# Patient Record
Sex: FEMALE | Race: WHITE | NOT HISPANIC OR LATINO | Employment: OTHER | URBAN - METROPOLITAN AREA
[De-identification: names, ages, dates, MRNs, and addresses within clinical notes are randomized per-mention and may not be internally consistent; named-entity substitution may affect disease eponyms.]

---

## 2017-01-16 LAB — COLOGUARD-ABSTRACT: NEGATIVE

## 2018-04-17 ENCOUNTER — TRANSFERRED RECORDS (OUTPATIENT)
Dept: HEALTH INFORMATION MANAGEMENT | Facility: CLINIC | Age: 73
End: 2018-04-17

## 2018-04-23 ENCOUNTER — TRANSFERRED RECORDS (OUTPATIENT)
Dept: HEALTH INFORMATION MANAGEMENT | Facility: CLINIC | Age: 73
End: 2018-04-23

## 2018-06-21 DIAGNOSIS — D33.3 ACOUSTIC NEUROMA (H): Primary | ICD-10-CM

## 2018-06-23 NOTE — TELEPHONE ENCOUNTER
FUTURE VISIT INFORMATION      FUTURE VISIT INFORMATION:    Date: 6-26-18    Time:     Location:   REFERRAL INFORMATION:    Referring provider:  MOSHE BRITT     Referring providers clinic:  [facility]    Reason for visit/diagnosis  acoustic schwannoma    RECORDS REQUESTED FROM:       Clinic name Comments Records Status Imaging Status   [facility] Office notes  Audio: 4-17-18, 8-12-10, 4-25-18,11-21-17 Received     Suburb imaging MRI: 4-23-18 received Received                              RECORDS STATUS

## 2018-06-26 ENCOUNTER — PRE VISIT (OUTPATIENT)
Dept: OTOLARYNGOLOGY | Facility: CLINIC | Age: 73
End: 2018-06-26

## 2018-06-26 ENCOUNTER — OFFICE VISIT (OUTPATIENT)
Dept: OTOLARYNGOLOGY | Facility: CLINIC | Age: 73
End: 2018-06-26
Payer: MEDICARE

## 2018-06-26 ENCOUNTER — OFFICE VISIT (OUTPATIENT)
Dept: AUDIOLOGY | Facility: CLINIC | Age: 73
End: 2018-06-26
Payer: MEDICARE

## 2018-06-26 DIAGNOSIS — H90.3 SENSORY HEARING LOSS, BILATERAL: Primary | ICD-10-CM

## 2018-06-26 DIAGNOSIS — D33.3 VESTIBULAR SCHWANNOMA (H): Primary | ICD-10-CM

## 2018-06-26 RX ORDER — MAGNESIUM HYDROXIDE/ALUMINUM HYDROXICE/SIMETHICONE 120; 1200; 1200 MG/30ML; MG/30ML; MG/30ML
125 SUSPENSION ORAL PRN
COMMUNITY

## 2018-06-26 ASSESSMENT — PAIN SCALES - GENERAL: PAINLEVEL: NO PAIN (0)

## 2018-06-26 NOTE — MR AVS SNAPSHOT
After Visit Summary   2018    Umm Hallman    MRN: [id number]           Patient Information     Date Of Birth          1945        Visit Information        Provider Department      2018 12:30 PM Alondra Little AuD [facility] Audiology        Today's Diagnoses     Sensory hearing loss, bilateral    -  1      Care Instructions    .          Follow-ups after your visit        Who to contact     Please call your clinic at [phone] to:    Ask questions about your health    Make or cancel appointments    Discuss your medicines    Learn about your test results    Speak to your doctor            Additional Information About Your Visit        MyChart Information     [employer] is an electronic gateway that provides easy, online access to your medical records. With [employer], you can request a clinic appointment, read your test results, renew a prescription or communicate with your care team.     To sign up for [employer] visit the website at [email or web address]   You will be asked to enter the access code listed below, as well as some personal information. Please follow the directions to create your username and password.     Your access code is: [email or web address]  Expires: 2018  1:34 PM     Your access code will  in 90 days. If you need help or a new code, please contact your [facility] or call [phone] for assistance.        Care EveryWhere ID     This is your Care EveryWhere ID. This could be used by other organizations to access your [place] medical records  [id number]         Blood Pressure from Last 3 Encounters:   03/07/10 148/82    Weight from Last 3 Encounters:   03/07/10 66.7 kg (147 lb)              We Performed the Following     AUDIOGRAM/TYMPANOGRAM - INTERFACE     [facility] Audiometry Thrld Eval & Speech Recog (71340)     Tymps / Reflex   ([id number])        Primary Care Provider Office Phone # Fax #    Alberto Garza,  [phone]       [facility] [address]  [facility] [address]        Equal Access to Services     RUBIO GIBBONS : Dima lyssa lopez jenny Menchaca, porsha meredithivania, jennifer santizo, shantal dorain hayaan jassmagy kay laotfevelyn nova. So [facility] [phone].    ATENCIÓN: Si habla español, tiene a pedraza disposición servicios gratuitos de asistencia lingüística. Dano al [phone].    We comply with applicable federal civil rights laws and [place] laws. We do not discriminate on the basis of race, color, national origin, age, disability, sex, sexual orientation, or gender identity.            Thank you!     Thank you for choosing [facility] AUDIOLOGY  for your care. Our goal is always to provide you with excellent care. Hearing back from our patients is one way we can continue to improve our services. Please take a few minutes to complete the written survey that you may receive in the mail after your visit with us. Thank you!             Your Updated Medication List - Protect others around you: Learn how to safely use, store and throw away your medicines at www.disposemymeds.org.          This list is accurate as of 6/26/18  1:34 PM.  Always use your most recent med list.                   Brand Name Dispense Instructions for use Diagnosis    alum & mag hydroxide-simethicone 200-200-20 MG/5ML Susp suspension    MYLANTA/MAALOX     Take 125 mLs by mouth as needed for indigestion        ASPIRIN PO      Take 81 mg by mouth daily        SIMVASTATIN PO      Take 5 mg by mouth daily        * SUDAFED 12 HOUR PO      None Entered        * pseudoePHEDrine 30 MG/5ML liquid    SUDAFED     Take 30 mg by mouth daily        ZOLPIDEM TARTRATE PO      Take 2 mg by mouth nightly as needed for sleep        * Notice:  This list has 2 medication(s) that are the same as other medications prescribed for you. Read the directions carefully, and ask your doctor or other care provider to review them with you.

## 2018-06-26 NOTE — PROGRESS NOTES
AUDIOLOGY REPORT    SUMMARY: Audiology visit completed. See audiogram for results.      RECOMMENDATIONS: Follow-up with ENT.      Gail Pimentel, CCC-A  Licensed Audiologist  [place] #[id number]

## 2018-06-26 NOTE — MR AVS SNAPSHOT
After Visit Summary   2018    Umm Hallman    MRN: [id number]           Patient Information     Date Of Birth          1945        Visit Information        Provider Department      2018 1:30 PM Viji Burkett MD [facility] Ear Nose and Throat        Care Instructions    1. Please follow-up in clinic in 1 year with audiogram and MRI scan.   2. Please call the ENT clinic with any questions,concerns, new or worsening symptoms.    -Clinic number is [phone]   - [facility] direct line (Dr. Burkett and Dr. Knight' nurse) [phone]              Follow-ups after your visit        Who to contact     Please call your clinic at [phone] to:    Ask questions about your health    Make or cancel appointments    Discuss your medicines    Learn about your test results    Speak to your doctor            Additional Information About Your Visit        MyChart Information     [employer] is an electronic gateway that provides easy, online access to your medical records. With [employer], you can request a clinic appointment, read your test results, renew a prescription or communicate with your care team.     To sign up for [employer] visit the website at [email or web address]   You will be asked to enter the access code listed below, as well as some personal information. Please follow the directions to create your username and password.     Your access code is: [email or web address]  Expires: 2018  1:34 PM     Your access code will  in 90 days. If you need help or a new code, please contact your [facility] or call [phone] for assistance.        Care EveryWhere ID     This is your Care EveryWhere ID. This could be used by other organizations to access your [place] medical records  [id number]         Blood Pressure from Last 3 Encounters:   03/07/10 148/82    Weight from Last 3 Encounters:   03/07/10 66.7 kg (147 lb)              Today, you had the  following     No orders found for display       Primary Care Provider Office Phone # Fax #    Alberto Garza [phone]       [facility] [address]  [facility] [address]        Equal Access to Services     RUBIO GIBBONS : Hadsuhail lyssa ku hadtitao Sopatiali, waaxda luqadaha, qaybta kaalmada adeegyada, shantal kay laTroyjuan bhatt. So [facility] [phone].    ATENCIÓN: Si habla español, tiene a pedraza disposición servicios gratuitos de asistencia lingüística. Ll[facility] [phone].    We comply with applicable federal civil rights laws and [place] laws. We do not discriminate on the basis of race, color, national origin, age, disability, sex, sexual orientation, or gender identity.            Thank you!     Thank you for choosing [facility] EAR NOSE AND THROAT  for your care. Our goal is always to provide you with excellent care. Hearing back from our patients is one way we can continue to improve our services. Please take a few minutes to complete the written survey that you may receive in the mail after your visit with us. Thank you!             Your Updated Medication List - Protect others around you: Learn how to safely use, store and throw away your medicines at www.disposemymeds.org.          This list is accurate as of 6/26/18  2:48 PM.  Always use your most recent med list.                   Brand Name Dispense Instructions for use Diagnosis    alum & mag hydroxide-simethicone 200-200-20 MG/5ML Susp suspension    MYLANTA/MAALOX     Take 125 mLs by mouth as needed for indigestion        ASPIRIN PO      Take 81 mg by mouth daily        SIMVASTATIN PO      Take 5 mg by mouth daily        * SUDAFED 12 HOUR PO      None Entered        * pseudoePHEDrine 30 MG/5ML liquid    SUDAFED     Take 30 mg by mouth daily        ZOLPIDEM TARTRATE PO      Take 2 mg by mouth nightly as needed for sleep        * Notice:  This list has 2 medication(s) that are the same as other medications prescribed  for you. Read the directions carefully, and ask your doctor or other care provider to review them with you.

## 2018-06-26 NOTE — LETTER
6/26/2018       RE: Umm Hallman  [address] [facility] [address]     Dear Colleague,    Thank you for referring your patient, Umm Hallman, to the [facility] EAR NOSE AND THROAT at [facility]. Please see a copy of my visit note below.    Service Date: 06/26/2018      HISTORY OF PRESENT ILLNESS:  Ms. 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  A detailed summary of today's visit has been entered into the record by our resident, Sai Whitmore, which I have reviewed, amended and incorporated into my own.       Ms. Hallman is 73 years old.  This scan was done because of right-sided ear pain and the incidental finding of the left-sided cerebellopontine angle lesion was noted.  She has had an audiogram that demonstrated left-sided hearing with a 25-dB speech reception threshold and 100% word recognition, but a 50-dB speech reception threshold with 60% word recognition on the right.  She denies significant disequilibrium.      PHYSICAL EXAMINATION:  Her examination is normal.      IMAGING:  We reviewed the images and they are consistent with a lesion that bulges slightly out of the internal auditory canal and is just short of touching the surface of the brainstem.  There are some prominent vessels on the surface of the brainstem adjacent to the tumor.  It is consistent with a vestibular schwannoma.      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      RECOMMENDATIONS:  We discussed the nature of vestibular schwannoma and its effects on balance and hearing.  We indicated to her that while she has serviceable hearing at this point in time, any significant change in either word recognition or speech reception threshold would shift her to a non-serviceable  category.  We talked about the various options of watchful waiting, stereotactic radiosurgery, and microsurgical excision with or without attempted hearing preservation.  She has done a good deal of background work and is quite well-informed.       At the present time, she wishes to pursue a course of watchful waiting and we agreed.  She will see us in a year with an audiogram and scan.        She asked for a referral for her  who has experienced significant cognitive decline following a serious urinary tract infection and we have made that referral.         KELY CLARK MD             D: 2018   T: 2018   MT: IRA      Name:     MIGUEL DUNCAN   MRN:      [id number]        Account:      [id number]   :      1945           Service Date: 2018      Document: [id number]

## 2018-06-26 NOTE — MR AVS SNAPSHOT
After Visit Summary   2018    Umm Hallman    MRN: [id number]           Patient Information     Date Of Birth          1945        Visit Information        Provider Department      2018 1:30 PM Servando Knight MD [facility] Ear Nose and Throat        Today's Diagnoses     Vestibular schwannoma (H)    -  1       Follow-ups after your visit        Who to contact     Please call your clinic at [phone] to:    Ask questions about your health    Make or cancel appointments    Discuss your medicines    Learn about your test results    Speak to your doctor            Additional Information About Your Visit        MyChart Information     [employer] is an electronic gateway that provides easy, online access to your medical records. With [employer], you can request a clinic appointment, read your test results, renew a prescription or communicate with your care team.     To sign up for tuult visit the website at [email or web address]   You will be asked to enter the access code listed below, as well as some personal information. Please follow the directions to create your username and password.     Your access code is: [email or web address]  Expires: 2018  1:34 PM     Your access code will  in 90 days. If you need help or a new code, please contact your [facility] or call [phone] for assistance.        Care EveryWhere ID     This is your Care EveryWhere ID. This could be used by other organizations to access your [place] medical records  [id number]         Blood Pressure from Last 3 Encounters:   03/07/10 148/82    Weight from Last 3 Encounters:   03/07/10 66.7 kg (147 lb)              Today, you had the following     No orders found for display       Primary Care Provider Office Phone # Fax #    Alberto Garza [phone]       [employer] [address]  [facility] [address]        Equal Access to Services      RUBIO GIBBONS : Hadii aad ku hadmarina Sopatiali, waaxda luqadaha, qaybta kaalmada adeeliane, shantal mely robertevelyn duffy rebecamarybeth muniz . So [facility] [phone].    ATENCIÓN: Si habla español, tiene a pedraza disposición servicios gratuitos de asistencia lingüística. Llame al [phone].    We comply with applicable federal civil rights laws and [place] laws. We do not discriminate on the basis of race, color, national origin, age, disability, sex, sexual orientation, or gender identity.            Thank you!     Thank you for choosing [facility] EAR NOSE AND THROAT  for your care. Our goal is always to provide you with excellent care. Hearing back from our patients is one way we can continue to improve our services. Please take a few minutes to complete the written survey that you may receive in the mail after your visit with us. Thank you!             Your Updated Medication List - Protect others around you: Learn how to safely use, store and throw away your medicines at www.disposemymeds.org.          This list is accurate as of 6/26/18 11:59 PM.  Always use your most recent med list.                   Brand Name Dispense Instructions for use Diagnosis    alum & mag hydroxide-simethicone 200-200-20 MG/5ML Susp suspension    MYLANTA/MAALOX     Take 125 mLs by mouth as needed for indigestion        ASPIRIN PO      Take 81 mg by mouth daily        SIMVASTATIN PO      Take 5 mg by mouth daily        * SUDAFED 12 HOUR PO      None Entered        * pseudoePHEDrine 30 MG/5ML liquid    SUDAFED     Take 30 mg by mouth daily        ZOLPIDEM TARTRATE PO      Take 2 mg by mouth nightly as needed for sleep        * Notice:  This list has 2 medication(s) that are the same as other medications prescribed for you. Read the directions carefully, and ask your doctor or other care provider to review them with you.

## 2018-06-26 NOTE — PATIENT INSTRUCTIONS
1. Please follow-up in clinic in 1 year with audiogram and MRI scan.   2. Please call the ENT clinic with any questions,concerns, new or worsening symptoms.    -Clinic number is [phone]   - [facility] direct line (Dr. Burkett and Dr. Knight' nurse) [phone]

## 2018-06-26 NOTE — LETTER
6/26/2018       RE: Umm Hallman  [address] [facility] [address]     Dear Colleague,    Thank you for referring your patient, Umm Hallman, to the [facility] EAR NOSE AND THROAT at [facility]. Please see a copy of my visit note below.    [facility] NEUROTOLOGY [facility]IC    Dear Dr. Jha   CC: Alberto Garza:    I had the pleasure of meeting Umm Hallman in consultation today at the [facility] at your request.    HISTORY OF PRESENT ILLNESS:  Ms Hallman is a 73-year-old female with a PMH significant for pre-HTN and mitral valve prolapse who presents to clinic for consultation regarding recently discovered right CPA mass. Patient states that she has had a progressive right-sided hearing loss for the past 5-6 years that has continued to worsen. She reports particular difficulty discriminating speech. She recently presented to medical attention for left otalgia, at which time an audiogram was obtained. Audiogram demonstrated asymmetric SNHL, which prompted subsequent MR Brain.    Patient reports a history of imbalance since the early 1980s. Patient believes that this imbalance comes on insidiously, is not associated with nausea or vomiting, and improves with pseudophed. Patient has had ocassional migraine headaches with visual aura, photophobia, and phonophobia for the past 3-4 months. Patient denies tinnitus, otalgia, otorrhea, and aural pressure.    Chief Complaint   Patient presents with     Consult     acoustic schwannoma        PAST MEDICAL HISTORY:  - pre-HTN  - Mitral valve prolapse    PAST SURGICAL HISTORY:  - Appendectomy in 1963  - No problems with anesthesia    No past medical history on file.ALLERGIES:  Allergies   Allergen Reactions     Metronidazole      Nausea and dizziness     Sulphamethoxydiazine      Blurred vision, lightheadedness     Ultram [Tramadol Hcl]      rash        Current Outpatient Prescriptions   Medication     alum & mag hydroxide-simethicone (MYLANTA/MAALOX) 200-200-20 MG/5ML SUSP suspension     ASPIRIN PO     pseudoePHEDrine (SUDAFED) 30 MG/5ML liquid     SIMVASTATIN PO     ZOLPIDEM TARTRATE PO     SUDAFED 12 HOUR OR     No current facility-administered medications for this visit.      SOCIAL HISTORY:   - Patient quit tobacco smoking 40 years ago  - Patient denies alcohol use    FAMILY HISTORY:   - History of hearing loss in mother    PHYSICIAL EXAMINATION:  Constitutional: The patient was well-groomed and in no acute distress.   Skin: Warm and pink.  Psychiatric: The patient's affect was calm, cooperative, and appropriate.   Respiratory: Breathing comfortably without stridor or exertion of accessory muscles.  Eyes: Pupils were equal and reactive. Extraocular movement intact.   Head: Normocephalic and atraumatic. No lesions or scars.  Ears: Bilateral EACs are lined with normal epithelium and without masses or lesions. Both TMs are intact and there are no signs of middle ear effusion. Collazo is midline and Rinne is positive bilaterally.  Nose: Anterior rhinoscopy revealed midline septum and absence of purulence or polyps.  Oral Cavity: Mucosa is pink and moist  Neck: The parotid is soft without masses. Supple with normal laryngeal and tracheal landmarks.   Lymphatic: There is no palpable lymphadenopathy or other masses in the neck.   Neurologic: Alert and oriented x 3. Cranial nerves III-XI within normal limits. Voice quality normal.  Vestibular examination: No spontaneous or gaze evoked nystagmus. Cerebellar testing with finger nose finger normal.  Positive Rhomberg with deviation to left. Difficulty with tandem gait.    AUDIOGRAM:  The right ear shows moderate to moderately severe SNHL with a PTA of 49dB, SRT of 50dB, and WRS of 60% at 75dB. The left ear shows a mild to moderate SNHL with PTA of 23dB, SRT of 25dB, and WRS of 100% at 65dB. Right ipsilateral acoustic  reflex is intact; right contralateral and both left reflexes are absent.    IMAGING:  MR Brain reveals a right CPA lesion that bulges out of the medial IAC but does not appear to compress the brainstem.    IMPRESSION AND PLAN:  Ms Hallman is a 73-year-old female with a PMH significant for pre-HTN and mitral valve prolapse who presents to clinic for consultation regarding recently discovered right CPA mass that is consistent with a vestibular schwannoma. Patient was educated on the risks and benefits of each treatment modality, including- observation, stereotactic radiotherapy, and surgical resection. Patient elects to proceed with observation. Patient will return to clinic in approximately one year with a follow-up audiogram and MR. She was counseled to return to clinic sooner if she develops significant changes in her hearing or vertigo. Patient expressed understanding and agrees with the treatment plan.    Thank you very much for the opportunity to participate in the care of your patient.    Sera Whitmore MD  Department of Otolaryngology, PGY-2  [facility]    I, Viji Burkett, saw this patient with the resident/fellow and agree with the resident's findings and plan of care as documented in the resident's/fellow's note. I spent a total of 30 minutes face-to-face with Umm Hallman during today s office visit. Over 50% of this time was spent counseling the patient and/or coordinating care regarding his schwannoma.    Again, thank you for allowing me to participate in the care of your patient.      Sincerely,    Viji Burkett MD

## 2018-06-27 NOTE — PROGRESS NOTES
Service Date: 06/26/2018      HISTORY OF PRESENT ILLNESS:  Ms. 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  A detailed summary of today's visit has been entered into the record by our resident, Sai Whitmore, which I have reviewed, amended and incorporated into my own.       Ms. Hallman is 73 years old.  This scan was done because of right-sided ear pain and the incidental finding of the left-sided cerebellopontine angle lesion was noted.  She has had an audiogram that demonstrated left-sided hearing with a 25-dB speech reception threshold and 100% word recognition, but a 50-dB speech reception threshold with 60% word recognition on the right.  She denies significant disequilibrium.      PHYSICAL EXAMINATION:  Her examination is normal.      IMAGING:  We reviewed the images and they are consistent with a lesion that bulges slightly out of the internal auditory canal and is just short of touching the surface of the brainstem.  There are some prominent vessels on the surface of the brainstem adjacent to the tumor.  It is consistent with a vestibular schwannoma.      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      RECOMMENDATIONS:  We discussed the nature of vestibular schwannoma and its effects on balance and hearing.  We indicated to her that while she has serviceable hearing at this point in time, any significant change in either word recognition or speech reception threshold would shift her to a non-serviceable category.  We talked about the various options of watchful waiting, stereotactic radiosurgery, and microsurgical excision with or without attempted hearing preservation.  She has done a good deal of background work and is quite well-informed.       At the present time, she wishes to pursue a course of  watchful waiting and we agreed.  She will see us in a year with an audiogram and scan.        She asked for a referral for her  who has experienced significant cognitive decline following a serious urinary tract infection and we have made that referral.         KELY CLARK MD             D: 2018   T: 2018   MT: IRA      Name:     MIGUEL DUNCAN   MRN:      [id number]        Account:      [id number]   :      1945           Service Date: 2018      Document: [id number]

## 2018-06-29 NOTE — PROGRESS NOTES
[facility]    Dear Dr. Jha   CC: Alberto Garza:    I had the pleasure of meeting Umm Hallman in consultation today at the [facility] at your request.    HISTORY OF PRESENT ILLNESS:  Ms Hallman is a 73-year-old female with a PMH significant for pre-HTN and mitral valve prolapse who presents to clinic for consultation regarding recently discovered right CPA mass. Patient states that she has had a progressive right-sided hearing loss for the past 5-6 years that has continued to worsen. She reports particular difficulty discriminating speech. She recently presented to medical attention for left otalgia, at which time an audiogram was obtained. Audiogram demonstrated asymmetric SNHL, which prompted subsequent MR Brain.    Patient reports a history of imbalance since the early 1980s. Patient believes that this imbalance comes on insidiously, is not associated with nausea or vomiting, and improves with pseudophed. Patient has had ocassional migraine headaches with visual aura, photophobia, and phonophobia for the past 3-4 months. Patient denies tinnitus, otalgia, otorrhea, and aural pressure.    Chief Complaint   Patient presents with     Consult     acoustic schwannoma        PAST MEDICAL HISTORY:  - pre-HTN  - Mitral valve prolapse    PAST SURGICAL HISTORY:  - Appendectomy in 1963  - No problems with anesthesia    No past medical history on file.ALLERGIES:  Allergies   Allergen Reactions     Metronidazole      Nausea and dizziness     Sulphamethoxydiazine      Blurred vision, lightheadedness     Ultram [Tramadol Hcl]      rash       Current Outpatient Prescriptions   Medication     alum & mag hydroxide-simethicone (MYLANTA/MAALOX) 200-200-20 MG/5ML SUSP suspension     ASPIRIN PO     pseudoePHEDrine (SUDAFED) 30 MG/5ML liquid     SIMVASTATIN PO     ZOLPIDEM TARTRATE PO     SUDAFED 12 HOUR OR     No current facility-administered  medications for this visit.      SOCIAL HISTORY:   - Patient quit tobacco smoking 40 years ago  - Patient denies alcohol use    FAMILY HISTORY:   - History of hearing loss in mother    PHYSICIAL EXAMINATION:  Constitutional: The patient was well-groomed and in no acute distress.   Skin: Warm and pink.  Psychiatric: The patient's affect was calm, cooperative, and appropriate.   Respiratory: Breathing comfortably without stridor or exertion of accessory muscles.  Eyes: Pupils were equal and reactive. Extraocular movement intact.   Head: Normocephalic and atraumatic. No lesions or scars.  Ears: Bilateral EACs are lined with normal epithelium and without masses or lesions. Both TMs are intact and there are no signs of middle ear effusion. Collazo is midline and Rinne is positive bilaterally.  Nose: Anterior rhinoscopy revealed midline septum and absence of purulence or polyps.  Oral Cavity: Mucosa is pink and moist  Neck: The parotid is soft without masses. Supple with normal laryngeal and tracheal landmarks.   Lymphatic: There is no palpable lymphadenopathy or other masses in the neck.   Neurologic: Alert and oriented x 3. Cranial nerves III-XI within normal limits. Voice quality normal.  Vestibular examination: No spontaneous or gaze evoked nystagmus. Cerebellar testing with finger nose finger normal.  Positive Rhomberg with deviation to left. Difficulty with tandem gait.    AUDIOGRAM:  The right ear shows moderate to moderately severe SNHL with a PTA of 49dB, SRT of 50dB, and WRS of 60% at 75dB. The left ear shows a mild to moderate SNHL with PTA of 23dB, SRT of 25dB, and WRS of 100% at 65dB. Right ipsilateral acoustic reflex is intact; right contralateral and both left reflexes are absent.    IMAGING:  MR Brain reveals a right CPA lesion that bulges out of the medial IAC but does not appear to compress the brainstem.    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 Patient was educated on the risks and benefits of each treatment modality, including- observation, stereotactic radiotherapy, and surgical resection. Patient elects to proceed with observation. Patient will return to clinic in approximately one year with a follow-up audiogram and MR. She was counseled to return to clinic sooner if she develops significant changes in her hearing or vertigo. Patient expressed understanding and agrees with the treatment plan.    Thank you very much for the opportunity to participate in the care of your patient.    Sera Whitmore MD  Department of Otolaryngology, PGY-2  [facility]    I, Viji Burkett, saw this patient with the resident/fellow and agree with the resident's findings and plan of care as documented in the resident's/fellow's note. I spent a total of 30 minutes face-to-face with Umm Hallman during today s office visit. Over 50% of this time was spent counseling the patient and/or coordinating care regarding his schwannoma.

## 2019-03-06 LAB — MAMMOGRAM: NORMAL

## 2019-06-13 DIAGNOSIS — F40.240 CLAUSTROPHOBIA: Primary | ICD-10-CM

## 2019-06-13 RX ORDER — DIAZEPAM 5 MG
TABLET ORAL
Qty: 2 TABLET | Refills: 0 | Status: SHIPPED | OUTPATIENT
Start: 2019-06-13 | End: 2019-07-14

## 2019-06-27 ENCOUNTER — DOCUMENTATION ONLY (OUTPATIENT)
Dept: CARE COORDINATION | Facility: CLINIC | Age: 74
End: 2019-06-27

## 2019-07-31 DIAGNOSIS — H91.90 HEARING LOSS: Primary | ICD-10-CM

## 2019-08-13 ENCOUNTER — OFFICE VISIT (OUTPATIENT)
Dept: OTOLARYNGOLOGY | Facility: CLINIC | Age: 74
End: 2019-08-13
Payer: COMMERCIAL

## 2019-08-13 ENCOUNTER — OFFICE VISIT (OUTPATIENT)
Dept: AUDIOLOGY | Facility: CLINIC | Age: 74
End: 2019-08-13
Payer: COMMERCIAL

## 2019-08-13 ENCOUNTER — ANCILLARY PROCEDURE (OUTPATIENT)
Dept: MRI IMAGING | Facility: CLINIC | Age: 74
End: 2019-08-13
Attending: OTOLARYNGOLOGY
Payer: COMMERCIAL

## 2019-08-13 DIAGNOSIS — H91.90 HEARING LOSS: ICD-10-CM

## 2019-08-13 DIAGNOSIS — D33.3 VESTIBULAR SCHWANNOMA (H): ICD-10-CM

## 2019-08-13 DIAGNOSIS — D33.3 VESTIBULAR SCHWANNOMA (H): Primary | ICD-10-CM

## 2019-08-13 DIAGNOSIS — H90.3 SENSORINEURAL HEARING LOSS, BILATERAL: Primary | ICD-10-CM

## 2019-08-13 RX ORDER — GADOBUTROL 604.72 MG/ML
7.5 INJECTION INTRAVENOUS ONCE
Status: COMPLETED | OUTPATIENT
Start: 2019-08-13 | End: 2019-08-13

## 2019-08-13 RX ORDER — ZOSTER VACCINE RECOMBINANT, ADJUVANTED 50 MCG/0.5
KIT INTRAMUSCULAR
Refills: 0 | COMMUNITY
Start: 2019-07-10

## 2019-08-13 RX ORDER — LISINOPRIL 10 MG/1
10 TABLET ORAL
COMMUNITY
Start: 2019-03-06

## 2019-08-13 RX ADMIN — GADOBUTROL 7.5 ML: 604.72 INJECTION INTRAVENOUS at 13:47

## 2019-08-13 ASSESSMENT — PAIN SCALES - GENERAL: PAINLEVEL: NO PAIN (0)

## 2019-08-13 NOTE — LETTER
2019       RE: Umm Hallman  [address] [facility] [address]     Dear Colleague,    Thank you for referring your patient, Umm Hallman, to the [facility] EAR NOSE AND THROAT at [facility]. Please see a copy of my visit note below.      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  she is seen in conjunction with my colleague in neurotology, Dr. Viji Burkett. Patient last seen on 18 and elected for observation of tumor growth at that time.     Today the patient appears well and has no complaints. She is worried about her  and wants to be able to take care of him in the coming years.      Review of Systems:   Pertinent items are noted in HPI or as in patient entered ROS below, remainder of complete ROS is negative.   No flowsheet data found.      Physical Exam:   There were no vitals taken for this visit.     General: No acute distress.   Head: No signs of trauma.    Eyes: Conjunctivae are normal. Pupils are equal. EOMI  Mouth/Throat: Oropharynx moist.  Neck: Normal range of motion.    Resp: No respiratory distress.   MSK: Moves all extremities.  No obvious deformity.  Neuro: The patient is alert and interactive. Speech normal. Facial nerve function is normal.  Facial sensation is normal.  Psych: Normal mood and affect. Behavior is normal.        Audiogram:  AUDIOGRAM: She underwent an audiogram today. This demonstrated:    RESULTS: Left- normal sloping to severe SNHL; 10 dB improvement 250 Hz, 10 dB decline 1924-8321 Hz. Right-  borderline normal  sloping to profound SNHL; 10 dB decrease 1614-8581 Hz. 100% word rec. left, 60% right; stable. Negative pressure left tymp, normal right  w/ reflexes as marked.    The speech reception threshold is 50 dB on the right, 20 dB on the left, with 60% word recognition on the right, 100% on the left.     Imaging:  MRI Brain w & w/o contrast:  Impression:    1. Unchanged appearance of right canalicular vestibular schwannoma  since 4/23/2018.  2. Moderate Leukoaraiosis.  Report per radiology      Assessment and Plan:  Umm Hallman is a 74 year old female with history of vestibular schwannoma who presents for follow up. Imaging today appears stable and the patient is doing well. As she is worried about taking care of her  in the future as he worsens, she is interested in possible treatment of her tumor now to prevent growth later on. We discussed these options, including surgery and gamma knife radiation, along with the likelihood of growth. As of now, plan is for serial imaging, keeping in mind gamma knife if needed in the future.     Follow-up: Plan for follow up in one year or sooner with new symptoms such as balance difficulties.         Scribe Disclosure:  I, Man Zurita, am serving as a scribe to document services personally performed by Servando Knight MD at this visit, based upon the provider's statements to me. All documentation has been reviewed by the aforementioned provider prior to being entered into the official medical record.    I personally performed the services described in this note, as scribed by Man Zurita in my presence. I have reviewed and edited the note which is both complete and accurate.  Servando Knight MD

## 2019-08-13 NOTE — PATIENT INSTRUCTIONS
1. You were seen in the ENT Clinic today by .  If you have any questions or concerns after your appointment, please call   - Option 1: ENT Clinic: [phone]  - Option 2: [facility] (' Nurse): [phone]    2.   Plan to return to clinic in one year with an MRI and hearing test.     AYAD Zamorano  [facility] Ot[facility]aryng[facility]  [phone]

## 2019-08-13 NOTE — PROGRESS NOTES
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  she is seen in conjunction with my colleague in neurotology, Dr. Viji Burkett. Patient last seen on 18 and elected for observation of tumor growth at that time.     Today the patient appears well and has no complaints. She is worried about her  and wants to be able to take care of him in the coming years.      Review of Systems:   Pertinent items are noted in HPI or as in patient entered ROS below, remainder of complete ROS is negative.   No flowsheet data found.      Physical Exam:   There were no vitals taken for this visit.     General: No acute distress.   Head: No signs of trauma.    Eyes: Conjunctivae are normal. Pupils are equal. EOMI  Mouth/Throat: Oropharynx moist.  Neck: Normal range of motion.    Resp: No respiratory distress.   MSK: Moves all extremities.  No obvious deformity.  Neuro: The patient is alert and interactive. Speech normal. Facial nerve function is normal.  Facial sensation is normal.  Psych: Normal mood and affect. Behavior is normal.        Audiogram:  AUDIOGRAM: She underwent an audiogram today. This demonstrated:    RESULTS: Left- normal sloping to severe SNHL; 10 dB improvement 250 Hz, 10 dB decline 8342-8974 Hz. Right- borderline normal  sloping to profound SNHL; 10 dB decrease 6793-5757 Hz. 100% word rec. left, 60% right; stable. Negative pressure left tymp, normal right  w/ reflexes as marked.    The speech reception threshold is 50 dB on the right, 20 dB on the left, with 60% word recognition on the right, 100% on the  left.     Imaging:  MRI Brain w & w/o contrast:  Impression:    1. Unchanged appearance of right canalicular vestibular schwannoma  since 4/23/2018.  2. Moderate Leukoaraiosis.  Report per radiology      Assessment and Plan:  Umm Hallman is a 74 year old female with history of vestibular schwannoma who presents for follow up. Imaging today appears stable and the patient is doing well. As she is worried about taking care of her  in the future as he worsens, she is interested in possible treatment of her tumor now to prevent growth later on. We discussed these options, including surgery and gamma knife radiation, along with the likelihood of growth. As of now, plan is for serial imaging, keeping in mind gamma knife if needed in the future.     Follow-up: Plan for follow up in one year or sooner with new symptoms such as balance difficulties.         Scribe Disclosure:  I, Man Zurita, am serving as a scribe to document services personally performed by Servando Knight MD at this visit, based upon the provider's statements to me. All documentation has been reviewed by the aforementioned provider prior to being entered into the official medical record.    I personally performed the services described in this note, as scribed by Man Zurita in my presence. I have reviewed and edited the note which is both complete and accurate.  Servando Knight MD

## 2019-08-13 NOTE — LETTER
2019       RE: Umm Hallman  [address] [facility] [address]     Dear Colleague,    Thank you for referring your patient, Umm Hallman, to the [facility] EAR NOSE AND THROAT at [facility]. Please see a copy of my visit note below.      [facility]      Name: Umm Hallman  MRN: [id number]  Age: 74 year old  : 2020      Chief Complaint:   RECHECK       History of Present Illness:   Umm Hallman is a 74 year old female with a history of PMH significant for pre-HTN and mitral valve prolapse with CPA mass who was seen in the [facility] for follow up.  She is seen in conjunction with my colleague in neurosurgery, Dr. Servando Knight. The patient was seen on 18 for newly discovered CPA mass with progressive right sided hearing loss that was acutely worsening. After reviewing treatment options, she elected to proceed with observation.  She presents today for routine follow-up.    The patient reports that overall she is doing quite well.  She has had some challenging social situations with her  recently getting sick and requiring additional care, but she denies any new or concerning symptoms.  She reports ongoing issues with dizziness, which she describes as disequilibrium brought on by rapid turns.  She denies any true room spinning vertigo.  She reports that her disequilibrium improves when she takes Sudafed.  She has had these issues going on since the 1980s.  She denies any falls.  She denies any facial asymmetry, change in her hearing, changes in facial sensation, or other symptoms.     Review of Systems:   Pertinent items are noted in HPI or as in patient entered ROS below, remainder of complete ROS is negative.   No flowsheet data found.      Physical Exam:   There were no vitals taken for this visit.     Constitutional:  The  patient was unaccompanied, well-groomed, and in no acute distress.     Skin: Normal:  warm and pink without rash   Neurologic: Alert and oriented x 3.  HB 1/6.  Sensation intact to light touch in V1 through V3 distributions. Voice normal.    Psychiatric: The patient's affect was calm, cooperative, and appropriate.     Communication:  Normal; communicates verbally, normal voice quality.   Respiratory: Breathing comfortably without stridor or exertion of accessory muscles.    Head/Face:  No lesions or scars.    Eyes: Pupils were equal and reactive.  Extraocular movement intact, no nystagmus.   Ears: Pinnae and tragus non-tender.  EAC's and TM's were clear on handheld otoscopy.     Audiogram:  Audiologic testing from 8/13/2019 was reviewed.  She has bilateral downsloping sensorineural hearing loss, worse on the right.  Her PTA is 51 dB on the right, 24 dB on the left, stable compared to prior exam on 6/26/2018.  The BRS 60% on the right, 100% on the left, unchanged from prior exam.    Imaging:  MRI with and without contrast from 8/13/2019 was reviewed independently.  Her right canalicular tumor is 13 x 7 x 10 mm, essentially unchanged from prior exam.  The mass extends into the CPA but does not compress the brainstem.     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  She denies any changes in her symptoms.  Her MRI and audiogram are stable compared to prior.  We had another discussion about the risks, benefits, and alternatives to watchful waiting, radiosurgery, and microsurgery.  Given the patient's difficult current social situation, she is to reluctant do anything that might compromise her ability to care for her .  It is reasonable to continue to want monitor the tumor with serial imaging, as it is not changed in the last year or so.  We will plan to see her back in 1 year with repeat  MRI and audiogram.  All the patient's questions were answered and she expresses understanding.      Fermin Mccord MD  Otolaryngology-Head & Neck Surgery PGY-2    Viji Burkett MD  Otology & Neurotology  [facility]    I, Viji Burkett, saw this patient with the resident/fellow and agree with the resident's findings and plan of care as documented in the resident's/fellow's note.    Scribe Preparation Attestation:  I, Man Zuriat, a scribe, prepared the chart for today's encounter.         Again, thank you for allowing me to participate in the care of your patient.      Sincerely,    Viji Burkett MD

## 2019-08-13 NOTE — PROGRESS NOTES
AUDIOLOGY REPORT    SUMMARY: Audiology visit completed. See audiogram for results.      RECOMMENDATIONS: Follow-up with ENT.      Oh Ramirez.  Licensed Audiologist  [place] #[id number]

## 2019-08-13 NOTE — DISCHARGE INSTRUCTIONS
MRI Contrast Discharge Instructions    The IV contrast you received today will pass out of your body in your  urine. This will happen in the next 24 hours. You will not feel this process.  Your urine will not change color.    Drink at least 4 extra glasses of water or juice today (unless your doctor  has restricted your fluids). This reduces the stress on your kidneys.  You may take your regular medicines.    If you are on dialysis: It is best to have dialysis today.    If you have a reaction: Most reactions happen right away. If you have  any new symptoms after leaving the hospital (such as hives or swelling),  call your hospital at the correct number below. Or call your family doctor.  If you have breathing distress or wheezing, call 911.    Special instructions: ***    I have read and understand the above information.    Signature:______________________________________ Date:___________    Staff:__________________________________________ Date:___________     Time:__________    [facility] Departments:    ___[place]: [phone]  ___[facility]: [phone]  ___[place]: [phone] ___[facility]: [phone]  ___[facility]: [phone]  ___[facility]: [phone]  ___[phone]  ___[facility]: [phone]  ___[phone]

## 2019-08-13 NOTE — PROGRESS NOTES
[facility]      Name: Umm Hallman  MRN: [id number]  Age: 74 year old  : 2020      Chief Complaint:   RECHECK       History of Present Illness:   Umm Hallman is a 74 year old female with a history of PMH significant for pre-HTN and mitral valve prolapse with CPA mass who was seen in the [facility] for follow up.  She is seen in conjunction with my colleague in neurosurgery, Dr. Servando Knight. The patient was seen on 18 for newly discovered CPA mass with progressive right sided hearing loss that was acutely worsening. After reviewing treatment options, she elected to proceed with observation.  She presents today for routine follow-up.    The patient reports that overall she is doing quite well.  She has had some challenging social situations with her  recently getting sick and requiring additional care, but she denies any new or concerning symptoms.  She reports ongoing issues with dizziness, which she describes as disequilibrium brought on by rapid turns.  She denies any true room spinning vertigo.  She reports that her disequilibrium improves when she takes Sudafed.  She has had these issues going on since the .  She denies any falls.  She denies any facial asymmetry, change in her hearing, changes in facial sensation, or other symptoms.     Review of Systems:   Pertinent items are noted in HPI or as in patient entered ROS below, remainder of complete ROS is negative.   No flowsheet data found.      Physical Exam:   There were no vitals taken for this visit.     Constitutional:  The patient was unaccompanied, well-groomed, and in no acute distress.     Skin: Normal:  warm and pink without rash   Neurologic: Alert and oriented x 3.  HB 1/6.  Sensation intact to light touch in V1 through V3 distributions. Voice normal.    Psychiatric: The patient's affect was calm, cooperative, and  appropriate.     Communication:  Normal; communicates verbally, normal voice quality.   Respiratory: Breathing comfortably without stridor or exertion of accessory muscles.    Head/Face:  No lesions or scars.    Eyes: Pupils were equal and reactive.  Extraocular movement intact, no nystagmus.   Ears: Pinnae and tragus non-tender.  EAC's and TM's were clear on handheld otoscopy.     Audiogram:  Audiologic testing from 8/13/2019 was reviewed.  She has bilateral downsloping sensorineural hearing loss, worse on the right.  Her PTA is 51 dB on the right, 24 dB on the left, stable compared to prior exam on 6/26/2018.  The BRS 60% on the right, 100% on the left, unchanged from prior exam.    Imaging:  MRI with and without contrast from 8/13/2019 was reviewed independently.  Her right canalicular tumor is 13 x 7 x 10 mm, essentially unchanged from prior exam.  The mass extends into the CPA but does not compress the brainstem.     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  She denies any changes in her symptoms.  Her MRI and audiogram are stable compared to prior.  We had another discussion about the risks, benefits, and alternatives to watchful waiting, radiosurgery, and microsurgery.  Given the patient's difficult current social situation, she is to reluctant do anything that might compromise her ability to care for her .  It is reasonable to continue to want monitor the tumor with serial imaging, as it is not changed in the last year or so.  We will plan to see her back in 1 year with repeat MRI and audiogram.  All the patient's questions were answered and she expresses understanding.      Fermin Mccord MD  Otolaryngology-Head & Neck Surgery PGY-2    Viji Burkett MD  Otology & Neurotology  [facility]    I, Viji Burkett, saw this patient with the  resident/fellow and agree with the resident's findings and plan of care as documented in the resident's/fellow's note.    Scribe Preparation Attestation:  I, Man Zurita, a scribe, prepared the chart for today's encounter.

## 2019-10-19 ENCOUNTER — OFFICE VISIT (OUTPATIENT)
Dept: URGENT CARE | Facility: URGENT CARE | Age: 74
End: 2019-10-19
Payer: MEDICARE

## 2019-10-19 VITALS
WEIGHT: 144.2 LBS | SYSTOLIC BLOOD PRESSURE: 159 MMHG | HEIGHT: 67 IN | OXYGEN SATURATION: 97 % | DIASTOLIC BLOOD PRESSURE: 79 MMHG | HEART RATE: 106 BPM | BODY MASS INDEX: 22.63 KG/M2

## 2019-10-19 DIAGNOSIS — J02.9 SORE THROAT: Primary | ICD-10-CM

## 2019-10-19 DIAGNOSIS — J06.9 VIRAL UPPER RESPIRATORY TRACT INFECTION: ICD-10-CM

## 2019-10-19 PROBLEM — M85.80 OSTEOPENIA: Status: ACTIVE | Noted: 2019-10-19

## 2019-10-19 PROBLEM — I34.1 MITRAL VALVE PROLAPSE: Status: ACTIVE | Noted: 2019-10-19

## 2019-10-19 PROBLEM — D33.3 VESTIBULAR SCHWANNOMA (H): Status: ACTIVE | Noted: 2018-08-16

## 2019-10-19 PROBLEM — F51.01 PRIMARY INSOMNIA: Status: ACTIVE | Noted: 2017-01-12

## 2019-10-19 PROBLEM — I10 ESSENTIAL HYPERTENSION: Status: ACTIVE | Noted: 2018-08-16

## 2019-10-19 PROBLEM — Z87.898 HISTORY OF PALPITATIONS: Status: ACTIVE | Noted: 2017-07-21

## 2019-10-19 LAB
BASOPHILS # BLD AUTO: 0 10E9/L (ref 0–0.2)
BASOPHILS NFR BLD AUTO: 0.1 %
DEPRECATED S PYO AG THROAT QL EIA: NORMAL
DIFFERENTIAL METHOD BLD: NORMAL
EOSINOPHIL # BLD AUTO: 0.1 10E9/L (ref 0–0.7)
EOSINOPHIL NFR BLD AUTO: 1.7 %
LYMPHOCYTES # BLD AUTO: 0.9 10E9/L (ref 0.8–5.3)
LYMPHOCYTES NFR BLD AUTO: 12.8 %
MONOCYTES # BLD AUTO: 0.5 10E9/L (ref 0–1.3)
MONOCYTES NFR BLD AUTO: 7 %
NEUTROPHILS # BLD AUTO: 5.5 10E9/L (ref 1.6–8.3)
NEUTROPHILS NFR BLD AUTO: 78.4 %
SPECIMEN SOURCE: NORMAL
WBC # BLD AUTO: 7 10E9/L (ref 4–11)

## 2019-10-19 PROCEDURE — 87081 CULTURE SCREEN ONLY: CPT | Performed by: NURSE PRACTITIONER

## 2019-10-19 PROCEDURE — 85004 AUTOMATED DIFF WBC COUNT: CPT | Performed by: NURSE PRACTITIONER

## 2019-10-19 PROCEDURE — 87880 STREP A ASSAY W/OPTIC: CPT | Performed by: NURSE PRACTITIONER

## 2019-10-19 PROCEDURE — 99203 OFFICE O/P NEW LOW 30 MIN: CPT | Performed by: NURSE PRACTITIONER

## 2019-10-19 PROCEDURE — 85048 AUTOMATED LEUKOCYTE COUNT: CPT | Performed by: NURSE PRACTITIONER

## 2019-10-19 PROCEDURE — 36415 COLL VENOUS BLD VENIPUNCTURE: CPT | Performed by: NURSE PRACTITIONER

## 2019-10-19 RX ORDER — PREDNISONE 20 MG/1
20 TABLET ORAL DAILY
Qty: 5 TABLET | Refills: 0 | Status: SHIPPED | OUTPATIENT
Start: 2019-10-19 | End: 2019-10-24

## 2019-10-19 ASSESSMENT — ENCOUNTER SYMPTOMS
MYALGIAS: 1
HEADACHES: 1
RHINORRHEA: 1
DIARRHEA: 0
NECK PAIN: 1
FEVER: 0
COUGH: 1
DIZZINESS: 0
ABDOMINAL PAIN: 0
LIGHT-HEADEDNESS: 0
VOMITING: 0
SORE THROAT: 1
NAUSEA: 0

## 2019-10-19 ASSESSMENT — MIFFLIN-ST. JEOR: SCORE: 1181.72

## 2019-10-19 NOTE — PATIENT INSTRUCTIONS
Prednisone daily for 5 days   Push Fluids  Plenty of rest  Tylenol and ibuprofen to help with pain or fever  Humidified air can help with congestion  Nasal saline or Flonase nasal spray to help with congestion  Salt water gargles, anesthetic throat spray, or lozenges to help with sore throat.

## 2019-10-19 NOTE — PROGRESS NOTES
[facility]  SUBJECTIVE:   Umm Hallman is a 74 year old female presenting with a chief complaint of   Chief Complaint   Patient presents with     Urgent Care     Pharyngitis     Pt states sore throat sxs 3x days        She is a new patient of [facility].    URI Adult    Onset of symptoms was 3 day(s) ago.  Course of illness is worsening.    Severity moderate  Current and Associated symptoms: runny nose, stuffy nose, cough - non-productive, ear pain right, headache and body aches  Treatment measures tried include sudafed.  Predisposing factors include seasonal allergies.  Denies any sick contacts, denies history of asthma.   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       Review of Systems   Constitutional: Negative for fever.   HENT: Positive for congestion, ear pain (right side), rhinorrhea and sore throat.    Eyes: Negative for visual disturbance.   Respiratory: Positive for cough.    Gastrointestinal: Negative for abdominal pain, diarrhea, nausea and vomiting.   Musculoskeletal: Positive for myalgias and neck pain.   Skin: Negative for rash.   Allergic/Immunologic: Positive for environmental allergies.   Neurological: Positive for headaches. Negative for dizziness and light-headedness.       History reviewed. No pertinent past medical history.  History reviewed. No pertinent family history.  Current Outpatient Medications   Medication Sig Dispense Refill     alum & mag hydroxide-simethicone (MYLANTA/MAALOX) 200-200-20 MG/5ML SUSP suspension Take 125 mLs by mouth as needed for indigestion       ASPIRIN PO Take 81 mg by mouth daily       lisinopril (PRINIVIL/ZESTRIL) 10 MG tablet Take 10 mg by mouth       predniSONE (DELTASONE) 20 MG tablet Take 1 tablet (20 mg) by mouth daily for 5 days 5 tablet 0     pseudoePHEDrine (SUDAFED) 30 MG/5ML liquid Take 30 mg by mouth daily       SHINGRIX injection   0      "SIMVASTATIN PO Take 5 mg by mouth daily       SUDAFED 12 HOUR OR None Entered       ZOLPIDEM TARTRATE PO Take 2 mg by mouth nightly as needed for sleep       Social History     Tobacco Use     Smoking status: Former Smoker     Packs/day: 0.00     Smokeless tobacco: Never Used     Tobacco comment: quit 1979   Substance Use Topics     Alcohol use: Not on file       OBJECTIVE  BP (!) 159/79   Pulse 106   Ht 1.702 m (5' 7\")   Wt 65.4 kg (144 lb 3.2 oz)   SpO2 97%   BMI 22.58 kg/m      Physical Exam  Vitals signs and nursing note reviewed.   Constitutional:       Appearance: Normal appearance. She is well-developed.   HENT:      Head: Normocephalic and atraumatic.      Right Ear: Ear canal and external ear normal.      Left Ear: Ear canal and external ear normal.      Ears:      Comments: Patient does have small ear canals. The part of the TM that is visible appears normal bilaterally.      Nose: Rhinorrhea present.      Right Sinus: No maxillary sinus tenderness or frontal sinus tenderness.      Left Sinus: No maxillary sinus tenderness or frontal sinus tenderness.      Mouth/Throat:      Pharynx: Posterior oropharyngeal erythema present. No oropharyngeal exudate.   Eyes:      Conjunctiva/sclera: Conjunctivae normal.      Pupils: Pupils are equal, round, and reactive to light.   Neck:      Musculoskeletal: Normal range of motion and neck supple.   Cardiovascular:      Rate and Rhythm: Normal rate and regular rhythm.      Heart sounds: Normal heart sounds.   Pulmonary:      Effort: Pulmonary effort is normal.      Breath sounds: Normal breath sounds and air entry.   Lymphadenopathy:      Head:      Right side of head: Submandibular and tonsillar adenopathy present.      Left side of head: Submandibular and tonsillar adenopathy present.      Cervical: Cervical adenopathy present.   Skin:     General: Skin is warm and dry.   Neurological:      Mental Status: She is alert and oriented to person, place, and time. "   Psychiatric:         Behavior: Behavior is cooperative.         Labs:  Results for orders placed or performed in visit on 10/19/19 (from the past 24 hour(s))   Strep, Rapid Screen   Result Value Ref Range    Specimen Description Throat     Rapid Strep A Screen       NEGATIVE: No Group A streptococcal antigen detected by immunoassay, await culture report.   WBC with Diff   Result Value Ref Range    WBC 7.0 4.0 - 11.0 10e9/L    % Neutrophils 78.4 %    % Lymphocytes 12.8 %    % Monocytes 7.0 %    % Eosinophils 1.7 %    % Basophils 0.1 %    Absolute Neutrophil 5.5 1.6 - 8.3 10e9/L    Absolute Lymphocytes 0.9 0.8 - 5.3 10e9/L    Absolute Monocytes 0.5 0.0 - 1.3 10e9/L    Absolute Eosinophils 0.1 0.0 - 0.7 10e9/L    Absolute Basophils 0.0 0.0 - 0.2 10e9/L    Diff Method Automated Method          ASSESSMENT:      ICD-10-CM    1. Sore throat J02.9 Strep, Rapid Screen     Beta strep group A culture     WBC with Diff     predniSONE (DELTASONE) 20 MG tablet   2. Viral upper respiratory tract infection J06.9 predniSONE (DELTASONE) 20 MG tablet        Medical Decision Making:    Differential Diagnosis:  URI Adult/Peds:  Acute right otitis media, Bronchitis-viral, Laryngitis, Sinusitis, Strep pharyngitis, Tonsilitis, Viral pharyngitis and Viral upper respiratory illness    Serious Comorbid Conditions:  Adult:  None    PLAN:  Discussed with patient that rapid strep was negative. Will do confirmatory testing. WBC was also normal. Favor a viral illness. Symptomatic treatment recommendations discussed. Education was added to AVS. Patient was agreeable to plan and verbalized understanding.       Followup:    If not improving in 3-5 days or if condition worsens, follow up with your Primary Care Provider    Patient Instructions   Prednisone daily for 5 days   Push Fluids  Plenty of rest  Tylenol and ibuprofen to help with pain or fever  Humidified air can help with congestion  Nasal saline or Flonase nasal spray to help with  congestion  Salt water gargles, anesthetic throat spray, or lozenges to help with sore throat.

## 2019-10-20 LAB
BACTERIA SPEC CULT: NORMAL
SPECIMEN SOURCE: NORMAL

## 2019-12-08 ENCOUNTER — HEALTH MAINTENANCE LETTER (OUTPATIENT)
Age: 74
End: 2019-12-08

## 2019-12-20 ENCOUNTER — TELEPHONE (OUTPATIENT)
Dept: OTOLARYNGOLOGY | Facility: CLINIC | Age: 74
End: 2019-12-20

## 2019-12-20 NOTE — TELEPHONE ENCOUNTER
Spoke with patient regarding her symptoms. Per patient she is having a crunching noise in her ear that gets louder when she lays down to sleep at night. Explained to patient that this is tinnitus and is caused by a decrease in hearing. The brain creates a feedback when the hearing is diminished. Patient wanted to get something so she could sleep. Explained to her that there is no cure for tinnitus and there is no surgery that will make it go away. Offered that patient see her primary ENT to discuss hearing aides. Hearing aides increase the hearing and decrease the tinnitus. Patient stated she can hear me just fine, explained how the hearing aid decreases the noise she is hearing in her head. Also suggested to patient a white noise machine to be used at night when trying to fall asleep, as this will redirect the brain into not thinking about the crunching noise. Patient is insistent that something broke off in the ear and is rattling around. Dr. Burkett is unable to offer any other suggestion at this time other than hearing aides. Patient stated on several occasions that there is the possibility of overdose so she doesn't have to deal with this anymore. Patient then hung up on this nurse. Nurse attempted to call patient back with no answer. Attempted to contact behavioral health, left a message for return call. In the meantime, this nurse called [place] [employer] to conduct a wellfare check on the patient and her . [place] [employer] stated they would send someone out immediately to check on patient.

## 2019-12-20 NOTE — TELEPHONE ENCOUNTER
[facility] Call Center    Phone Message    May a detailed message be left on voicemail: yes    Reason for Call: Symptoms or Concerns     If patient has red-flag symptoms, warm transfer to triage line    Current symptom or concern: hearing a noise in ears    Symptoms have been present for:  1-2 month(s)    Has patient previously been seen for this? Yes    By : Dr. Burkett      Are there any new or worsening symptoms? Yes: patient called stating starting in October she started hearing a noise in her ear off and on and it has become gradually more and now its all the time. Please call to discuss thank you.       Action Taken: Message routed to:  [facility] ([facility]): ENT

## 2019-12-30 ENCOUNTER — DOCUMENTATION ONLY (OUTPATIENT)
Dept: CARE COORDINATION | Facility: CLINIC | Age: 74
End: 2019-12-30

## 2020-03-15 ENCOUNTER — HEALTH MAINTENANCE LETTER (OUTPATIENT)
Age: 75
End: 2020-03-15

## 2020-09-08 ENCOUNTER — TRANSFERRED RECORDS (OUTPATIENT)
Dept: HEALTH INFORMATION MANAGEMENT | Facility: CLINIC | Age: 75
End: 2020-09-08

## 2020-09-16 DIAGNOSIS — D33.3 VESTIBULAR SCHWANNOMA (H): Primary | ICD-10-CM

## 2020-12-05 ENCOUNTER — APPOINTMENT (OUTPATIENT)
Dept: CT IMAGING | Facility: CLINIC | Age: 75
End: 2020-12-05
Attending: PHYSICIAN ASSISTANT
Payer: MEDICARE

## 2020-12-05 ENCOUNTER — HOSPITAL ENCOUNTER (EMERGENCY)
Facility: CLINIC | Age: 75
Discharge: HOME | End: 2020-12-05
Attending: PHYSICIAN ASSISTANT | Admitting: PHYSICIAN ASSISTANT
Payer: MEDICARE

## 2020-12-05 VITALS
TEMPERATURE: 97.8 F | HEIGHT: 67 IN | RESPIRATION RATE: 20 BRPM | BODY MASS INDEX: 22.58 KG/M2 | SYSTOLIC BLOOD PRESSURE: 176 MMHG | DIASTOLIC BLOOD PRESSURE: 94 MMHG | OXYGEN SATURATION: 97 % | HEART RATE: 96 BPM

## 2020-12-05 DIAGNOSIS — R91.8 PULMONARY NODULES: ICD-10-CM

## 2020-12-05 DIAGNOSIS — K62.5 BRIGHT RED BLOOD PER RECTUM: ICD-10-CM

## 2020-12-05 DIAGNOSIS — K52.9 COLITIS: ICD-10-CM

## 2020-12-05 LAB
ALBUMIN SERPL-MCNC: 4 G/DL (ref 3.4–5)
ALP SERPL-CCNC: 74 U/L (ref 40–150)
ALT SERPL W P-5'-P-CCNC: 31 U/L (ref 0–50)
ANION GAP SERPL CALCULATED.3IONS-SCNC: 7 MMOL/L (ref 3–14)
AST SERPL W P-5'-P-CCNC: 18 U/L (ref 0–45)
BASOPHILS # BLD AUTO: 0 10E9/L (ref 0–0.2)
BASOPHILS NFR BLD AUTO: 0.2 %
BILIRUB SERPL-MCNC: 0.5 MG/DL (ref 0.2–1.3)
BUN SERPL-MCNC: 22 MG/DL (ref 7–30)
CALCIUM SERPL-MCNC: 9.3 MG/DL (ref 8.5–10.1)
CHLORIDE SERPL-SCNC: 107 MMOL/L (ref 94–109)
CO2 SERPL-SCNC: 26 MMOL/L (ref 20–32)
CREAT SERPL-MCNC: 0.72 MG/DL (ref 0.52–1.04)
DIFFERENTIAL METHOD BLD: ABNORMAL
EOSINOPHIL # BLD AUTO: 0.1 10E9/L (ref 0–0.7)
EOSINOPHIL NFR BLD AUTO: 1.1 %
ERYTHROCYTE [DISTWIDTH] IN BLOOD BY AUTOMATED COUNT: 13.4 % (ref 10–15)
GFR SERPL CREATININE-BSD FRML MDRD: 81 ML/MIN/{1.73_M2}
GLUCOSE SERPL-MCNC: 117 MG/DL (ref 70–99)
HCT VFR BLD AUTO: 41.7 % (ref 35–47)
HGB BLD-MCNC: 14 G/DL (ref 11.7–15.7)
IMM GRANULOCYTES # BLD: 0 10E9/L (ref 0–0.4)
IMM GRANULOCYTES NFR BLD: 0.2 %
INTERPRETATION ECG - MUSE: NORMAL
LYMPHOCYTES # BLD AUTO: 1 10E9/L (ref 0.8–5.3)
LYMPHOCYTES NFR BLD AUTO: 10.2 %
MCH RBC QN AUTO: 28.9 PG (ref 26.5–33)
MCHC RBC AUTO-ENTMCNC: 33.6 G/DL (ref 31.5–36.5)
MCV RBC AUTO: 86 FL (ref 78–100)
MONOCYTES # BLD AUTO: 0.5 10E9/L (ref 0–1.3)
MONOCYTES NFR BLD AUTO: 4.8 %
NEUTROPHILS # BLD AUTO: 8.5 10E9/L (ref 1.6–8.3)
NEUTROPHILS NFR BLD AUTO: 83.5 %
NRBC # BLD AUTO: 0 10*3/UL
NRBC BLD AUTO-RTO: 0 /100
PLATELET # BLD AUTO: 248 10E9/L (ref 150–450)
POTASSIUM SERPL-SCNC: 3.5 MMOL/L (ref 3.4–5.3)
PROT SERPL-MCNC: 7.5 G/DL (ref 6.8–8.8)
RBC # BLD AUTO: 4.85 10E12/L (ref 3.8–5.2)
SODIUM SERPL-SCNC: 140 MMOL/L (ref 133–144)
TROPONIN I SERPL-MCNC: <0.015 UG/L (ref 0–0.04)
WBC # BLD AUTO: 10.2 10E9/L (ref 4–11)

## 2020-12-05 PROCEDURE — 99285 EMERGENCY DEPT VISIT HI MDM: CPT | Mod: 25

## 2020-12-05 PROCEDURE — 74177 CT ABD & PELVIS W/CONTRAST: CPT

## 2020-12-05 PROCEDURE — 258N000003 HC RX IP 258 OP 636: Performed by: PHYSICIAN ASSISTANT

## 2020-12-05 PROCEDURE — 250N000011 HC RX IP 250 OP 636: Performed by: PHYSICIAN ASSISTANT

## 2020-12-05 PROCEDURE — 80053 COMPREHEN METABOLIC PANEL: CPT | Performed by: EMERGENCY MEDICINE

## 2020-12-05 PROCEDURE — 250N000009 HC RX 250: Performed by: PHYSICIAN ASSISTANT

## 2020-12-05 PROCEDURE — 96360 HYDRATION IV INFUSION INIT: CPT | Mod: 59

## 2020-12-05 PROCEDURE — 93005 ELECTROCARDIOGRAM TRACING: CPT

## 2020-12-05 PROCEDURE — 84484 ASSAY OF TROPONIN QUANT: CPT | Performed by: EMERGENCY MEDICINE

## 2020-12-05 PROCEDURE — 85025 COMPLETE CBC W/AUTO DIFF WBC: CPT | Performed by: EMERGENCY MEDICINE

## 2020-12-05 RX ORDER — IOPAMIDOL 755 MG/ML
72 INJECTION, SOLUTION INTRAVASCULAR ONCE
Status: COMPLETED | OUTPATIENT
Start: 2020-12-05 | End: 2020-12-05

## 2020-12-05 RX ORDER — ATORVASTATIN CALCIUM 10 MG/1
10 TABLET, FILM COATED ORAL
COMMUNITY
Start: 2020-08-13

## 2020-12-05 RX ADMIN — SODIUM CHLORIDE 61 ML: 9 INJECTION, SOLUTION INTRAVENOUS at 18:57

## 2020-12-05 RX ADMIN — SODIUM CHLORIDE 1000 ML: 9 INJECTION, SOLUTION INTRAVENOUS at 18:03

## 2020-12-05 RX ADMIN — IOPAMIDOL 72 ML: 755 INJECTION, SOLUTION INTRAVENOUS at 18:56

## 2020-12-05 ASSESSMENT — ENCOUNTER SYMPTOMS
BLOOD IN STOOL: 1
DIAPHORESIS: 1
DIARRHEA: 0
ABDOMINAL PAIN: 1

## 2020-12-05 NOTE — ED TRIAGE NOTES
Pt was having abd pain and cramping since 1500. Pt then had diarrhea and at 1600 had pure blood in toilet

## 2020-12-05 NOTE — ED AVS SNAPSHOT
[facility] Emergency Dept  [address] [address]  Phone: [phone]  Fax: [phone]                                    Umm Hallman   MRN: [id number]    Department: [facility] Emergency Dept   Date of Visit: 12/5/2020           After Visit Summary Signature Page    I have received my discharge instructions, and my questions have been answered. I have discussed any challenges I see with this plan with the nurse or doctor.    ..........................................................................................................................................  Patient/Patient Representative Signature      ..........................................................................................................................................  Patient Representative Print Name and Relationship to Patient    ..................................................               ................................................  Date                                   Time    ..........................................................................................................................................  Reviewed by Signature/Title    ...................................................              ..............................................  Date                                               Time          22EPIC Rev 08/18

## 2020-12-05 NOTE — ED PROVIDER NOTES
History     Chief Complaint:  Abdominal Pain and Rectal Bleeding    HPI   Umm Hallman is a 75 year old female with a history of hypertension who presents with abdominal cramping and rectal bleeding. Approximately 3 hours prior to arrival, while carrying laundry, she experienced a sudden onset of abdominal cramping. She then went to the bathroom, where she had several formed bowel movements. While on the toilet, she experienced an abnormal sensation, which she describes as drifting away, and severe hot flashes.  She had her  place a fan in the room, and these symptoms later resolved. She initially attributed these symptoms to low blood pressure, and noted her blood pressure to be 116/69, which is low for her. 1 hour later, she experienced another episode of abdominal cramping and went to the restroom and passed watery, bright red blood. She denies diarrhea and has no history of similar symptoms. She is not on any blood thinners.  Denies a history of similar symptoms.      Allergies:  Metronidazole  Sulphamethoxydiazine  Ultram     Medications:    Lipitor  Lisinopril  Maalox  Simvastatin  Ambien    Past Medical History:    Mitral valve prolapse  Osteopenia  Hypertension  Vestibular schwannoma  Insomnia  Hypercholesteremia   Arthrosis  Chronic right shoulder pain  Impingement syndrome of right shoulder    Past Surgical History:    Appendectomy  [place] teeth extraction     Family History:    Heart disease    Social History:  Smoking status: former, quit 1979  Alcohol use: not currently  Marital Status:   [2]     Review of Systems   Constitutional: Positive for diaphoresis.   Gastrointestinal: Positive for abdominal pain and blood in stool. Negative for diarrhea.   All other systems reviewed and are negative.    Physical Exam     Patient Vitals for the past 24 hrs:   BP Temp Temp src Pulse Resp SpO2 Height   12/05/20 1911 (!) 142/87 97.8  F (36.6  C) Oral 93 20 98 % --   12/05/20 1830 (!)  "155/109 -- -- 103 (!) 39 96 % --   12/05/20 1800 (!) 158/85 -- -- 96 9 94 % --   12/05/20 1653 (!) 200/83 97.6  F (36.4  C) Oral 117 16 96 % 1.702 m (5' 7\")     Physical Exam  General: Alert, interactive. GCS 15  Head:  Scalp is atraumatic.  Eyes:  EOM intact. The pupils are equal, round, and reactive to light. No scleral icterus.   ENT:                                      Ears:  The external ears are normal.   Nose:  The external nose is normal.  Throat:  The oropharynx is normal. Mucus membranes are moist.                 Neck:  Normal range of motion. There is no rigidity.   CV:  Regular rate and rhythm. No murmur. 2+ radial pulses  Resp:  Breath sounds are clear bilaterally. Non-labored, no retractions or accessory muscle use.  GI:  Abdomen is soft, no distension, no tenderness.   Rectal:  Completed by Dr. Blakely.   MS:  Normal range of motion.   Skin:  Warm and dry.   Neuro:  Strength and sensation grossly intact.   Psych:  Awake. Alert.  Appropriate interactions.     Emergency Department Course   ECG (18:34:34):  Rate 89 bpm. TX interval 170. QRS duration 76. QT/QTc 396/481. P-R-T axes 62 62 48. Normal sinus rhythm. Low voltage QRS. Borderline ECG. Interpreted at 1841 by Gerry Blakely MD.    Imaging:  Radiographic findings were communicated with the patient who voiced understanding of the findings.  CT Abdomen Pelvis w contrast   IMPRESSION:   1.  Mild mucosal thickening involving the transverse, descending, and   sigmoid colon likely related to an infectious or inflammatory colitis.   2.  Incidental 4 mm noncalcified pulmonary nodule in the right middle   lobe  Reading per radiology    Laboratory:  Laboratory findings were communicated with the patient who voiced understanding of the findings.  CBC: WNL. (WBC 10.2, HGB 14.0, )   CMP: Glucose 117 (H), o/w WNL (Creatinine: 0.72)    Troponin (Collected 1705): <0.015    Interventions:  1803 NS 1L IV Bolus    Emergency Department Course:  Past medical " records, nursing notes, and vitals reviewed.  1738: I performed an exam of the patient and obtained history, as documented above.     Blood drawn. This was sent to the lab for further testing, results above.    An ECG was obtained while in the emergency department, findings above.    The patient was sent for an abdominal CT while in the emergency department, findings above.    2033: I rechecked the patient. Findings and plan explained to the Patient. Patient discharged home with instructions regarding supportive care, medications, and reasons to return. The importance of close follow-up was reviewed.     Impression & Plan      Medical Decision Making:  Umm Hallman is a 75 year old female who presents after an episode of abdominal cramping and bright red blood per rectum.  Differential diagnosis is broad and includes diverticulitis, colitis, GI bleed, hemorrhoids, rectal abscess, among others.      Blood work overall reassuring.  Hemoglobin stable.  CT reveals mild mucosal thickening main involving the transverse, descending, and sigmoid colon likely related to colitis.  The differential of colitis includes ischemic, bacterial, idiopathic, autoimmune, etc. Patient is not having diarrhea and unable to collect stool samples.  Patient looks well and initial and repeat abdominal exam reassuring without focal abdominal tenderness.  Patient has no fevers, leukocytosis, or diarrhea to suggest infectious source.  At this time, unclear source of colitis.  Plan for close follow-up with GI early next week and strict return precautions discussed including fever/chills, abdominal pain, worsening bloody stool, faint or feel that she is going to faint, or any other concerning symptoms develop.    Discussed the incidental CT finding of pulmonary nodule with the patient and she understands the importance of close follow-up with primary care provider.    Diagnosis:    ICD-10-CM    1. Colitis  K52.9    2. Bright red blood  per rectum  K62.5    3. Pulmonary nodules  R91.8      Disposition:  discharged to home    Scribe Disclosure:  I, Jocelyn Luna, am serving as a scribe at 5:37 PM on 12/5/2020 to document services personally performed by Nyla Hsieh PA-C based on my observations and the provider's statements to me.     Jocelyn Luna  12/5/2020   [facility] EMERGENCY DEPT       Nyla Hsieh PA-C  12/05/20 215

## 2020-12-06 NOTE — DISCHARGE INSTRUCTIONS
*Follow up with Dr. Styles's clinic early next week.   *Follow up with primary care provider to discuss incidental finding of pulmonary nodule on CT scan.   *Return to emergency department for fever/chills, increasing abdominal pain, increased rectal bleeding, or any other new/concerning symptoms develop.

## 2020-12-06 NOTE — ED PROVIDER NOTES
"Emergency Department Attending Supervision Note  12/5/2020  6:23 PM    I evaluated this patient in conjunction with Nyla Hsieh PA-C     Briefly, the patient presented with abdominal pain and rectal bleeding. The patient reports that three hours prior to arrival (1500) she had sudden onset of abdominal cramping followed by a normal bowel movement. The patient then described subsequent \"hot flashes and pins and needles\" sensation\" that has since resolved. She noted that she was hypotensive at that time. An hour after the initial symptoms (1600) began, the patient had a loose, bloody stool, prompting her visit to the emergency department. She is not anticoagulated.     On my exam,   BP (!) 142/87   Pulse 93   Temp 97.8  F (36.6  C) (Oral)   Resp 20   Ht 1.702 m (5' 7\")   SpO2 98%   BMI 22.58 kg/m    General: Alert, appears well-developed and well-nourished. Cooperative.     In no acute distress, anxious  HEENT:  Head:  Atraumatic  Ears:  External ears are normal  Mouth/Throat:  Oropharynx is without erythema or exudate and mucous membranes are moist.   Eyes:   Conjunctivae normal and EOM are normal. No scleral icterus.  CV:  Tachycardic rate, regular rhythm, normal heart sounds and radial pulses are 2+ and symmetric.  No murmur.  Resp:  Breath sounds are clear bilaterally    Non-labored, no retractions or accessory muscle use  GI:  Abdomen is soft, no distension, no tenderness. No rebound or guarding.    Rectal: Exam performed in presence of female RN.  Single non-bleeding external hemorrhoid.  No anal fissure.  No active bleeding from rectum.   MS:  Normal range of motion. No edema.    Normal strength in all 4 extremities.     Back atraumatic.    No midline cervical, thoracic, or lumbar tenderness  Skin:  Warm and dry.  No rash or lesions noted.  Neuro: Alert. Normal strength.  GCS: 15  Psych:  Normal mood and affect.    Results:  ECG (18:34:34):  Rate 89 bpm. MO interval 170. QRS duration 76. QT/QTc " 396/481. P-R-T axes 62 62 48. Normal sinus rhythm. Low voltage QRS. Borderline ECG. Interpreted at 1841 by Gerry Blakely MD.    Laboratory:  Laboratory findings were communicated with the patient who voiced understanding of the findings.  CBC: WNL. (WBC 10.2, HGB 14.0, )   CMP: Glucose 117 (H), o/w WNL (Creatinine: 0.72)  Troponin (1705): <0.015     Imaging  Radiology results were communicated with the patient who voiced understanding of the findings.     CT Abdomen Pelvis w Contrast   IMPRESSION:   1.  Mild mucosal thickening involving the transverse, descending, and  sigmoid colon likely related to an infectious or inflammatory colitis.  2.  Incidental 4 mm noncalcified pulmonary nodule in the right middle  lobe.  As read by Radiology.    MDM:  Umm Hallman is a 75 year old female who presents with blood in stool and presyncope. I considered a broad differential including  diverticulitis, colitis, appendicitis, functional bowel disease, constipation, UTI, GIB, pyelonephritis, ureterolithiasis, hernia, hemorrhoids, anal fissure, trauma etc.  Rare and serious causes were considered as well in this patient such as volvulus, abscess, aneurysmal disease, mesenteric ischemia, etc.     The workup in the ED is consistent with colitis.  The differential of this includes ischemic, bacterial, idiopathic, autoimmune, etc. The patient looks well and this point with a reassuring exam so I will not therefore admit for serial exams and further workup.  Patient was counseled on natural history of colitis and possibility of progression to abscess and/or sepsis depending on reason for the colitis.  Patient is hemodynamically stable in ED at this time.   Return for fevers greater than 102, increasing pain, other new symptoms develop.  Colitis handout given.  Questions were answered. GI follow up encouraged ASAP.    Diagnosis    ICD-10-CM    1. Colitis  K52.9    2. Bright red blood per rectum  K62.5    3. Pulmonary  nodules  R91.8          Scribe Disclosure:  I, Irasema Clark, am serving as a scribe at 6:23 PM on 12/5/2020 to document services personally performed by Gerry Blakely MD based on my observations and the provider's statements to me.         Gerry Blakely MD  12/05/20 3035

## 2021-05-08 ENCOUNTER — HEALTH MAINTENANCE LETTER (OUTPATIENT)
Age: 76
End: 2021-05-08

## 2021-10-23 ENCOUNTER — HEALTH MAINTENANCE LETTER (OUTPATIENT)
Age: 76
End: 2021-10-23

## 2021-10-30 ENCOUNTER — HOSPITAL ENCOUNTER (EMERGENCY)
Facility: CLINIC | Age: 76
Discharge: HOME | End: 2021-10-30
Attending: EMERGENCY MEDICINE | Admitting: EMERGENCY MEDICINE
Payer: MEDICARE

## 2021-10-30 ENCOUNTER — APPOINTMENT (OUTPATIENT)
Dept: CT IMAGING | Facility: CLINIC | Age: 76
End: 2021-10-30
Attending: EMERGENCY MEDICINE
Payer: MEDICARE

## 2021-10-30 ENCOUNTER — APPOINTMENT (OUTPATIENT)
Dept: GENERAL RADIOLOGY | Facility: CLINIC | Age: 76
End: 2021-10-30
Attending: EMERGENCY MEDICINE
Payer: MEDICARE

## 2021-10-30 VITALS
SYSTOLIC BLOOD PRESSURE: 157 MMHG | HEART RATE: 90 BPM | TEMPERATURE: 98.9 F | RESPIRATION RATE: 18 BRPM | DIASTOLIC BLOOD PRESSURE: 69 MMHG | OXYGEN SATURATION: 95 %

## 2021-10-30 DIAGNOSIS — T14.8XXA HEMATOMA OF MUSCLE: ICD-10-CM

## 2021-10-30 DIAGNOSIS — M25.551 ACUTE HIP PAIN, RIGHT: ICD-10-CM

## 2021-10-30 LAB
ATRIAL RATE - MUSE: 94 BPM
DIASTOLIC BLOOD PRESSURE - MUSE: NORMAL MMHG
INTERPRETATION ECG - MUSE: NORMAL
P AXIS - MUSE: 56 DEGREES
PR INTERVAL - MUSE: 154 MS
QRS DURATION - MUSE: 74 MS
QT - MUSE: 378 MS
QTC - MUSE: 472 MS
R AXIS - MUSE: 34 DEGREES
SYSTOLIC BLOOD PRESSURE - MUSE: NORMAL MMHG
T AXIS - MUSE: 52 DEGREES
VENTRICULAR RATE- MUSE: 94 BPM

## 2021-10-30 PROCEDURE — 99285 EMERGENCY DEPT VISIT HI MDM: CPT | Mod: 25

## 2021-10-30 PROCEDURE — 73502 X-RAY EXAM HIP UNI 2-3 VIEWS: CPT

## 2021-10-30 PROCEDURE — 73700 CT LOWER EXTREMITY W/O DYE: CPT | Mod: RT

## 2021-10-30 PROCEDURE — 93005 ELECTROCARDIOGRAM TRACING: CPT

## 2021-10-30 RX ORDER — OXYCODONE HYDROCHLORIDE 5 MG/1
2.5-5 TABLET ORAL EVERY 6 HOURS PRN
Qty: 4 TABLET | Refills: 0 | Status: SHIPPED | OUTPATIENT
Start: 2021-10-30

## 2021-10-30 ASSESSMENT — ENCOUNTER SYMPTOMS: ARTHRALGIAS: 1

## 2021-10-30 NOTE — ED TRIAGE NOTES
Pt presents to the ER with c/o right hip pain after a fall. Pt sts she took to big of a step up and fell.

## 2021-10-30 NOTE — ED PROVIDER NOTES
History   Chief Complaint:  Hip Pain       HPI   Umm Hallman is a 76 year old female with history of osteopenia, hypertension and hyperlipidemia who presents with hip pain. The patient states she was walking up a step and experienced more pain than normal in her right hip so she fell to the ground. She has not had a hip replacement but has bursitis and arthritis in the hip and received her last steroid shot about a week ago. She has been unable to walk on it. The pain while sitting is a 2/10 but increased when standing. She has not taken anything for pain. She is not on any blood thinners. She does not wish to have strong pain meds here.    Review of Systems   Musculoskeletal: Positive for arthralgias.   All other systems reviewed and are negative.    Allergies:  Metronidazole  Sulphamethoxydiazine  Ultram [Tramadol Hcl]    Medications:  Lipitor  Lisinopril  Simvastatin  Zolpidem  Sudafed    Past Medical History:     Hypertension   Mitral valve prolapse  Osteopenia  Vestibular schwannoma  Hyperlipidemia     Social History:  Presents alone  Former smoker  .  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  Lives at home.  Son is able to help out at home.  Immigrated from [place] when she  her  who was a GI.    Physical Exam     Patient Vitals for the past 24 hrs:   BP Temp Temp src Pulse Resp SpO2   10/30/21 1745 (!) 157/69 -- -- 90 -- --   10/30/21 1744 -- -- -- -- 16 95 %   10/30/21 1511 -- -- -- -- -- 100 %   10/30/21 1505 -- -- -- -- -- 99 %   10/30/21 1500 -- -- -- -- -- 98 %   10/30/21 1458 (!) 169/96 98.9  F (37.2  C) Oral 116 18 99 %       Physical Exam  General: Well-nourished, appears to be in pain  Eyes: PERRL, conjunctivae pink no scleral icterus or conjunctival injection  ENT:  Moist mucus membranes, posterior oropharynx clear without erythema or exudates  Respiratory:  Lungs clear to auscultation bilaterally, no crackles/rubs/wheezes.  Good air movement  CV: Mildly  tachycardic rate and regular rhythm, no murmurs/rubs/gallops.  Normal symmetric DP pulse, distal color and cap refill in both feet.  GI:  Abdomen soft and non-distended.  Normoactive BS.  No tenderness, guarding or rebound  Skin: Warm, dry.  No rashes or petechiae  Musculoskeletal: Tender over the greater trochanter of the right hip.  Pain with flexion extension of the hip.  Comes in using her 's wheelchair.  Neuro: Alert and oriented to person/place/time.,  Normal distal sensation to light touch over both legs.  Psychiatric: Normal affect      Emergency Department Course     Imaging:  CT Hip Right w/o Contrast   Final Result   IMPRESSION:   1.  No acute fracture.   2.  Swelling and likely a small focus of hemorrhage in the right gluteus medius muscle, most likely related to acute muscle and/or tendon injury.   3.  Additional swelling of the right piriformis muscle and soft tissue stranding posterior to the right hip joint which is nonspecific but could represent interstitial hemorrhage.   4.  MRI could assess further.         XR Pelvis w Hip Right 1 View   Final Result   IMPRESSION: No fracture. Mild degenerative arthritis of both hips. Degenerative changes in the lumbar spine. Diffuse bony demineralization.        Report per radiology    Emergency Department Course:  Reviewed:  I reviewed nursing notes, vitals, past medical history and Care Everywhere    Assessments:  1500 I obtained history and examined the patient as noted above.   1740 I rechecked the patient and explained findings.     Disposition:  The patient was discharged to home.     Impression & Plan     Medical Decision Making:  Umm Hallman is a delightful and witty 76 year old female who comes with lateral hip pain and inability to ambulate after stepping the wrong way.  X-rays revealed were no signs of fracture.  CT was thus obtained to evaluate for occult fracture.  No signs of occult fracture but did show a small hematoma and  likely intramuscular hemorrhage.  She may have a muscular tear versus a tendon injury.  This is a small hematoma.  She is not on any blood thinners.  She has no other neurologic symptoms to suggest that its developing a compartment syndrome or a large hematoma that is compressive.  I discussed the possibility of this with her and signs and symptoms to watch for.  She has an orthopedist that she follows with and she will follow up with them on Monday.  She will take Tylenol for pain.  Avoid ibuprofen.  She is given a prescription for oxycodone should she develop severe pain.  She already has a wheelchair at home.  I have asked her to rest.  Her son is willing and able to stay with her this weekend to help provide assistance to her.  She is asked to return should she have any worsening.  At this time, with reasonable clinical confidence, I do believe she safe for discharge home.       Diagnosis:    ICD-10-CM    1. Acute hip pain, right  M25.551    2. Hematoma of muscle  T14.8XXA        Discharge Medications:  New Prescriptions    OXYCODONE (ROXICODONE) 5 MG TABLET    Take 0.5-1 tablets (2.5-5 mg) by mouth every 6 hours as needed for moderate to severe pain or severe pain       Scribe Disclosure:  I, Irasema Chavez, am serving as a scribe at 2:57 PM on 10/30/2021 to document services personally performed by Nyla Meyer MD based on my observations and the provider's statements to me.             Nyla Meyer MD  10/30/21 2008

## 2022-06-04 ENCOUNTER — HEALTH MAINTENANCE LETTER (OUTPATIENT)
Age: 77
End: 2022-06-04

## 2022-10-09 ENCOUNTER — HEALTH MAINTENANCE LETTER (OUTPATIENT)
Age: 77
End: 2022-10-09

## 2022-11-26 ENCOUNTER — HEALTH MAINTENANCE LETTER (OUTPATIENT)
Age: 77
End: 2022-11-26

## 2024-01-06 ENCOUNTER — HEALTH MAINTENANCE LETTER (OUTPATIENT)
Age: 79
End: 2024-01-06

## 2025-02-18 NOTE — DISCHARGE INSTRUCTIONS
*Rest as much as possible.  *Tylenol for pain and oxycodone as directed as needed for severe pain.  *Followup with your orthopedic doctor on Monday.  *Return if you develop worsening pain, numbness or weakness in the leg, shortness of breath, chest pain, faint or feel like you will faint or become worse in any way.       .Weekly Wound Education Note    Teaching Provided To: Patient  Training Topics: Dressing;Cleasing and general instructions;Discharge instructions  Training Method: Explain/Verbal;Written  Training Response: Patient responds and understands        Notes: Wounds stable. Continue dakin's wet to dry with kerlix, bordered foam, and baby diaper with medipore tape to left breast. Dressing changed to patricio, folded xeroform, and bordered gauze to leg wound. Pt stated that she has received zetuvit dressings and they are working good.